# Patient Record
Sex: MALE
[De-identification: names, ages, dates, MRNs, and addresses within clinical notes are randomized per-mention and may not be internally consistent; named-entity substitution may affect disease eponyms.]

---

## 2020-10-26 ENCOUNTER — NURSE TRIAGE (OUTPATIENT)
Dept: OTHER | Facility: CLINIC | Age: 30
End: 2020-10-26

## 2020-10-26 NOTE — TELEPHONE ENCOUNTER
Employee calling to discuss ill symptoms in regards to working during the [address] [employer] emergency, see triage assessment below    Care Advice provided; patient acknowledged understanding of care advice and is in agreement with plan. Patient has no further questions; instructed to call back for any new or worsening symptoms. [employer] [facility] form filled out. Employee's manager e mailed. Number given, then transferred to [facility]- [phone]    Reason for Disposition   [1] COVID-19 infection suspected by caller or triager AND [2] mild symptoms (cough, fever, or others) AND [8] no complications or SOB    Answer Assessment - Initial Assessment Questions  1. COVID-19 DIAGNOSIS: \"Who made your Coronavirus (COVID-19) diagnosis? \" \"Was it confirmed by a positive lab test?\" If not diagnosed by a HCP, ask \"Are there lots of cases (community spread) where you live? \" (See public health department website, if unsure)      Patient has not been tested    2. ONSET: \"When did the COVID-19 symptoms start? \"       10/23/20    3. WORST SYMPTOM: \"What is your worst symptom? \" (e.g., cough, fever, shortness of breath, muscle aches)      Mild cough and intermittent SOB    4. COUGH: \"Do you have a cough? \" If so, ask: \"How bad is the cough? \"       Mild cough    5. FEVER: \"Do you have a fever? \" If so, ask: \"What is your temperature, how was it measured, and when did it start? \"     Denies fever    6. RESPIRATORY STATUS: \"Describe your breathing? \" (e.g., shortness of breath, wheezing, unable to speak)      Normal; with periods of intermittent SOB w/exertion    7. BETTER-SAME-WORSE: Arsh Medhat you getting better, staying the same or getting worse compared to yesterday? \"  If getting worse, ask, \"In what way? \"     Same    8. HIGH RISK DISEASE: \"Do you have any chronic medical problems? \" (e.g., asthma, heart or lung disease, weak immune system, etc.)      Denies high risk medical conditions    9. PREGNANCY: \"Is there any chance you are pregnant? \" \"When was your last menstrual period? \"     NA    10. OTHER SYMPTOMS: \"Do you have any other symptoms? \"  (e.g., chills, fatigue, headache, loss of smell or taste, muscle pain, sore throat)       Fatigue, mild headache, SOB w/exertion; abdominal pain 3/10    Protocols used: CORONAVIRUS (COVID-19) DIAGNOSED OR SUSPECTED-ADULT-[place]